# Patient Record
Sex: FEMALE | Race: WHITE | Employment: FULL TIME | URBAN - METROPOLITAN AREA
[De-identification: names, ages, dates, MRNs, and addresses within clinical notes are randomized per-mention and may not be internally consistent; named-entity substitution may affect disease eponyms.]

---

## 2023-12-12 ENCOUNTER — HOSPITAL ENCOUNTER (OUTPATIENT)
Age: 33
Discharge: ANOTHER HOSPITAL | End: 2023-12-12
Payer: COMMERCIAL

## 2023-12-12 ENCOUNTER — HOSPITAL ENCOUNTER (EMERGENCY)
Facility: HOSPITAL | Age: 33
Discharge: HOME | End: 2023-12-12
Attending: EMERGENCY MEDICINE
Payer: COMMERCIAL

## 2023-12-12 VITALS
HEIGHT: 65 IN | WEIGHT: 160 LBS | RESPIRATION RATE: 22 BRPM | BODY MASS INDEX: 26.66 KG/M2 | TEMPERATURE: 98 F | OXYGEN SATURATION: 99 % | SYSTOLIC BLOOD PRESSURE: 90 MMHG | HEART RATE: 94 BPM | DIASTOLIC BLOOD PRESSURE: 75 MMHG

## 2023-12-12 VITALS
RESPIRATION RATE: 20 BRPM | OXYGEN SATURATION: 100 % | DIASTOLIC BLOOD PRESSURE: 62 MMHG | TEMPERATURE: 98 F | SYSTOLIC BLOOD PRESSURE: 103 MMHG | HEART RATE: 96 BPM

## 2023-12-12 DIAGNOSIS — R07.89 CHEST PAIN, ATYPICAL: Primary | ICD-10-CM

## 2023-12-12 DIAGNOSIS — R07.81 PLEURITIC CHEST PAIN: Primary | ICD-10-CM

## 2023-12-12 DIAGNOSIS — R79.89 ELEVATED D-DIMER: ICD-10-CM

## 2023-12-12 LAB
#MXD IC: 0.3 X10ˆ3/UL (ref 0.1–1)
ATRIAL RATE: 101 BPM
BUN BLD-MCNC: 8 MG/DL (ref 7–18)
CHLORIDE BLD-SCNC: 102 MMOL/L (ref 98–112)
CO2 BLD-SCNC: 24 MMOL/L (ref 21–32)
CREAT BLD-MCNC: 0.5 MG/DL
D DIMER PPP FEU-MCNC: 1.08 UG/ML FEU (ref ?–0.5)
DDIMER WHOLE BLOOD: 1090 NG/ML DDU (ref ?–400)
EGFRCR SERPLBLD CKD-EPI 2021: 127 ML/MIN/1.73M2 (ref 60–?)
GLUCOSE BLD-MCNC: 93 MG/DL (ref 70–99)
HCT VFR BLD AUTO: 37.4 %
HCT VFR BLD CALC: 40 %
HGB BLD-MCNC: 12.5 G/DL
ISTAT IONIZED CALCIUM FOR CHEM 8: 1.18 MMOL/L (ref 1.12–1.32)
LYMPHOCYTES # BLD AUTO: 0.6 X10ˆ3/UL (ref 1–4)
LYMPHOCYTES NFR BLD AUTO: 9.5 %
MCH RBC QN AUTO: 29.4 PG (ref 26–34)
MCHC RBC AUTO-ENTMCNC: 33.4 G/DL (ref 31–37)
MCV RBC AUTO: 88 FL (ref 80–100)
MIXED CELL %: 5.1 %
NEUTROPHILS # BLD AUTO: 5.8 X10ˆ3/UL (ref 1.5–7.7)
NEUTROPHILS NFR BLD AUTO: 85.4 %
P AXIS: 17 DEGREES
P-R INTERVAL: 144 MS
PLATELET # BLD AUTO: 246 X10ˆ3/UL (ref 150–450)
POTASSIUM BLD-SCNC: 3.6 MMOL/L (ref 3.6–5.1)
Q-T INTERVAL: 328 MS
QRS DURATION: 76 MS
QTC CALCULATION (BEZET): 425 MS
R AXIS: 3 DEGREES
RBC # BLD AUTO: 4.25 X10ˆ6/UL
SODIUM BLD-SCNC: 136 MMOL/L (ref 136–145)
T AXIS: 13 DEGREES
TROPONIN I BLD-MCNC: <0.02 NG/ML
VENTRICULAR RATE: 101 BPM
WBC # BLD AUTO: 6.7 X10ˆ3/UL (ref 4–11)

## 2023-12-12 PROCEDURE — 85025 COMPLETE CBC W/AUTO DIFF WBC: CPT | Performed by: NURSE PRACTITIONER

## 2023-12-12 PROCEDURE — 93005 ELECTROCARDIOGRAM TRACING: CPT

## 2023-12-12 PROCEDURE — 99284 EMERGENCY DEPT VISIT MOD MDM: CPT

## 2023-12-12 PROCEDURE — 84484 ASSAY OF TROPONIN QUANT: CPT | Performed by: NURSE PRACTITIONER

## 2023-12-12 PROCEDURE — 93000 ELECTROCARDIOGRAM COMPLETE: CPT | Performed by: NURSE PRACTITIONER

## 2023-12-12 PROCEDURE — 85379 FIBRIN DEGRADATION QUANT: CPT | Performed by: EMERGENCY MEDICINE

## 2023-12-12 PROCEDURE — 80047 BASIC METABLC PNL IONIZED CA: CPT | Performed by: NURSE PRACTITIONER

## 2023-12-12 PROCEDURE — 93010 ELECTROCARDIOGRAM REPORT: CPT

## 2023-12-12 PROCEDURE — 36415 COLL VENOUS BLD VENIPUNCTURE: CPT

## 2023-12-12 PROCEDURE — 99204 OFFICE O/P NEW MOD 45 MIN: CPT | Performed by: NURSE PRACTITIONER

## 2023-12-12 NOTE — ED INITIAL ASSESSMENT (HPI)
Cough x 2 weeks reported to be mostly resolved. Here w/ concerns of L side cp that started on Sunday and has worsened since.  Worse by movement, deep breath  and is at rest.

## 2023-12-12 NOTE — ED INITIAL ASSESSMENT (HPI)
Pt sent from [address] [place] [address] for CP since . Pt has had a cough x weeks. Pt denies SOB. No fevers. 16 weeks pregnant, , no vaginal bleeding, abd cramping.

## 2023-12-13 LAB
ATRIAL RATE: 89 BPM
P AXIS: 12 DEGREES
P-R INTERVAL: 154 MS
Q-T INTERVAL: 340 MS
QRS DURATION: 76 MS
QTC CALCULATION (BEZET): 413 MS
R AXIS: -3 DEGREES
T AXIS: 8 DEGREES
VENTRICULAR RATE: 89 BPM

## 2025-05-20 ENCOUNTER — TELEPHONE (OUTPATIENT)
Facility: CLINIC | Age: 35
End: 2025-05-20

## 2025-05-20 DIAGNOSIS — M25.532 LEFT WRIST PAIN: Primary | ICD-10-CM

## 2025-05-20 NOTE — TELEPHONE ENCOUNTER
Patient scheduled for left wrist pain.    Future Appointments   Date Time Provider Department Center   6/3/2025  2:30 PM Sherwin Beebe MD [facility]     Please advise if imaging is needed.

## 2025-06-03 ENCOUNTER — HOSPITAL ENCOUNTER (OUTPATIENT)
Dept: GENERAL RADIOLOGY | Facility: HOSPITAL | Age: 35
Discharge: HOME | End: 2025-06-03
Attending: STUDENT IN AN ORGANIZED HEALTH CARE EDUCATION/TRAINING PROGRAM
Payer: COMMERCIAL

## 2025-06-03 ENCOUNTER — OFFICE VISIT (OUTPATIENT)
Age: 35
End: 2025-06-03
Payer: COMMERCIAL

## 2025-06-03 DIAGNOSIS — R52 PAIN: Primary | ICD-10-CM

## 2025-06-03 DIAGNOSIS — R52 PAIN: ICD-10-CM

## 2025-06-03 PROCEDURE — 99203 OFFICE O/P NEW LOW 30 MIN: CPT | Performed by: STUDENT IN AN ORGANIZED HEALTH CARE EDUCATION/TRAINING PROGRAM

## 2025-06-03 PROCEDURE — 73110 X-RAY EXAM OF WRIST: CPT | Performed by: STUDENT IN AN ORGANIZED HEALTH CARE EDUCATION/TRAINING PROGRAM

## 2025-06-03 NOTE — PROGRESS NOTES
Clinic Note          The following individual(s) verbally consented to be recorded using ambient AI listening technology and understand that they can each withdraw their consent to this listening technology at any point by asking the clinician to turn off or pause the recording:    Patient name: Jennifer Casiano    Allergies[1]    CC: Left wrist pain    History of Present Illness  Jennifer Casiano is a 34 year old LHD female who presents with left wrist pain.    She has been experiencing left wrist pain for approximately two to three months. Initially mild, the pain has intensified over time. It is described as shooting, primarily located in the wrist and radiating into her fingers. Activities such as typing and holding objects exacerbate the pain, and she notes a sensation of weakness in the wrist.    There is no history of recent injuries or accidents contributing to her symptoms. No numbness or tingling. The pain is intermittent but can become severe enough to interfere with daily activities.    She has previously used a wrist brace with a metal backing but has not consistently worn it, especially when the pain subsides.      Review of Systems:  Negative unless stated in HPI.    History/Other:   Past Medical History[2]  Past Surgical History[3]  Current Medications[4]  Family History[5]  Social History     Occupational History    Not on file   Tobacco Use    Smoking status: Never    Smokeless tobacco: Never   Vaping Use    Vaping status: Never Used   Substance and Sexual Activity    Alcohol use: Not on file     Comment: rarely    Drug use: Never    Sexual activity: Not on file        Physical Exam:    Physical Exam           Constitutional: NAD. AOx3. Well-developed and Well-nourished.   Psychiatric: Normal mood/ affect/ behavior. Judgment and thought content normal.     Left Upper Extremity:     Inspection    Skin intact. No skin lesions. No obvious mass visualized. No crepitus to ROM. Mild dorsal wrist  swelling.   Palpation    No tenderness to palpation at the radial styloid  No tenderness to palpation at the DRUJ  No tenderness to palpation at the SL interval  No tenderness to palpation at the 1st carpometacarpal joint   No Tenderness to palpation of the ulnar fovea, TFCC      ROM      ROM is grossly normal including flexion / extension, and pronosupination of the forearm.  Able to make a full fist and extend all fingers with ease     Neurovascular    Normal sensation in the median, ulnar, and radial nerve distribution. Normal motor function of muscles innervated by median/AIN, ulnar, and radial/PIN nerves.    Normally perfused hand(s).                     Diagnostic Studies:    Results      I reviewed the images independently from the professional interpretation, and discussed my interpretation of the pertinent findings with patient/family.    Xrays of Left wrist 3V: On my independent review of the radiographs, there is no acute bony abnormality noted.     Assessment/Plan:  34 year old female with Left wrist pain.    I reviewed the patient's electronic medical record, including the pertinent office notes, medical/surgical history, medications and images. I specifically reviewed the images noted above, independently and discussed my independent interpretation of these images in combination with the pertinent positive and negative findings in my clinical exam with the patient    Assessment & Plan  Left wrist pain:  Intermittent left wrist pain for two months. Pain is not associated with numbness or tingling. X-rays are normal, but swelling on the dorsal wrist suggests a possible occult cyst. Carpal tunnel is less likely due to lack of typical symptoms, but remains a possibility. The condition is not debilitating, and she has experienced periods of complete pain relief. Discussed obtaining an MRI now versus waiting to see if symptoms improve with conservative management. MRI may not be as useful when  asymptomatic.  - Recommend wearing a wrist brace for comfort and to reduce inflammation, especially during activities that may exacerbate symptoms.  - Consider MRI if symptoms persist or worsen, particularly if the pain becomes debilitating.  - Advise follow-up in six to eight weeks if symptoms do not improve with brace use.  - She opted to delay MRI unless symptoms worsen.        Sherwin Beebe MD   Hand, Wrist, & Elbow Surgery  [email or web address]       [1]   Allergies  Allergen Reactions    Iodine HIVES    Iodine (Topical) HIVES    Pumpkin RASH    Radiology Contrast Iodinated Dyes HIVES   [2] No past medical history on file.  [3] No past surgical history on file.  [4]   Current Outpatient Medications   Medication Sig Dispense Refill    Prenatal Vit-Fe Fumarate-FA (BENNIE-RUL PRENATAL VITAMINS OR) Take by mouth. (Patient not taking: Reported on 6/3/2025)     [5] No family history on file.